# Patient Record
Sex: FEMALE | Race: WHITE
[De-identification: names, ages, dates, MRNs, and addresses within clinical notes are randomized per-mention and may not be internally consistent; named-entity substitution may affect disease eponyms.]

---

## 2019-01-02 ENCOUNTER — HOSPITAL ENCOUNTER (EMERGENCY)
Dept: HOSPITAL 97 - ER | Age: 34
Discharge: HOME | End: 2019-01-02
Payer: COMMERCIAL

## 2019-01-02 VITALS — TEMPERATURE: 97.4 F | SYSTOLIC BLOOD PRESSURE: 121 MMHG | DIASTOLIC BLOOD PRESSURE: 77 MMHG

## 2019-01-02 VITALS — OXYGEN SATURATION: 100 %

## 2019-01-02 DIAGNOSIS — F17.210: ICD-10-CM

## 2019-01-02 DIAGNOSIS — R10.10: Primary | ICD-10-CM

## 2019-01-02 DIAGNOSIS — R11.2: ICD-10-CM

## 2019-01-02 LAB
ALBUMIN SERPL BCP-MCNC: 4.2 G/DL (ref 3.4–5)
ALP SERPL-CCNC: 73 U/L (ref 45–117)
ALT SERPL W P-5'-P-CCNC: 26 U/L (ref 12–78)
AST SERPL W P-5'-P-CCNC: 16 U/L (ref 15–37)
BUN BLD-MCNC: 12 MG/DL (ref 7–18)
GLUCOSE SERPLBLD-MCNC: 128 MG/DL (ref 74–106)
HCT VFR BLD CALC: 45.6 % (ref 36–45)
LIPASE SERPL-CCNC: 103 U/L (ref 73–393)
LYMPHOCYTES # SPEC AUTO: 1.6 K/UL (ref 0.7–4.9)
PMV BLD: 8.6 FL (ref 7.6–11.3)
POTASSIUM SERPL-SCNC: 4.3 MMOL/L (ref 3.5–5.1)
RBC # BLD: 5.09 M/UL (ref 3.86–4.86)

## 2019-01-02 PROCEDURE — 83690 ASSAY OF LIPASE: CPT

## 2019-01-02 PROCEDURE — 85025 COMPLETE CBC W/AUTO DIFF WBC: CPT

## 2019-01-02 PROCEDURE — 80048 BASIC METABOLIC PNL TOTAL CA: CPT

## 2019-01-02 PROCEDURE — 99284 EMERGENCY DEPT VISIT MOD MDM: CPT

## 2019-01-02 PROCEDURE — 36415 COLL VENOUS BLD VENIPUNCTURE: CPT

## 2019-01-02 PROCEDURE — 80076 HEPATIC FUNCTION PANEL: CPT

## 2019-01-02 NOTE — EDPHYS
Physician Documentation                                                                           

 [facility]                                                                

Name: Radha Meza                                                                              

Age: 33 yrs                                                                                       

Sex: Female                                                                                       

: 1985                                                                                   

MRN: [id number]                                                                                   

Arrival Date: 2019                                                                          

Time: 06:12                                                                                       

Account#: [id number]                                                                            

Bed 7                                                                                             

Private MD:                                                                                       

ED Physician Rahul Ernandez                                                                             

HPI:                                                                                              

                                                                                             

07:41 This 33 yrs old  Female presents to ER via Ambulatory with complaints of       snw 

      Abdominal Pain, Vomiting.                                                                   

07:41 The patient presents with abdominal pain in the epigastric area. Onset: The             snw 

      symptoms/episode began/occurred suddenly, at 02:00. The symptoms do not radiate.            

      Associated signs and symptoms: Pertinent positives: nausea and vomiting. The symptoms       

      are described as burning, constant. Severity of pain: At its worst the pain was             

      moderate severe. The patient has not experienced similar symptoms in the past. The          

      patient has not recently seen a physician.                                                  

                                                                                                  

OB/GYN:                                                                                           

06:32 LMP 2018                                                                          ca1 

                                                                                                  

Historical:                                                                                       

- Allergies:                                                                                      

06:32 No Known Allergies;                                                                     ca1 

- Home Meds:                                                                                      

06:32 None [Active];                                                                          ca1 

- PMHx:                                                                                           

06:32 None;                                                                                   ca1 

- PSHx:                                                                                           

06:32 Tonsillectomy; Left Shoulder; Tubal ligation;                                           ca1 

                                                                                                  

- Immunization history:: Flu vaccine is up to date.                                               

- Social history:: Smoking status: Patient uses tobacco products, denies chronic                  

  smoking, but will smoke occasionally.                                                           

- Ebola Screening: : No symptoms or risks identified at this time.                                

                                                                                                  

                                                                                                  

ROS:                                                                                              

07:16 Constitutional: Negative for fever, chills, and weight loss, Eyes: Negative for injury, snw 

      pain, redness, and discharge, ENT: Negative for injury, pain, and discharge, Neck:          

      Negative for injury, pain, and swelling, Cardiovascular: Negative for chest pain,           

      palpitations, and edema, Respiratory: Negative for shortness of breath, cough,              

      wheezing, and pleuritic chest pain, Back: Negative for injury and pain, : Negative        

      for injury, bleeding, discharge, and swelling, MS/Extremity: Negative for injury and        

      deformity, Skin: Negative for injury, rash, and discoloration, Neuro: Negative for          

      headache, weakness, numbness, tingling, and seizure.                                        

07:16 Abdomen/GI: Positive for abdominal pain, nausea.                                            

                                                                                                  

Exam:                                                                                             

07:15 Constitutional:  This is a well developed, well nourished patient who is awake, alert,  snw 

      and in no acute distress. Head/Face:  Normocephalic, atraumatic. Eyes:  Pupils equal        

      round and reactive to light, extra-ocular motions intact.  Lids and lashes normal.          

      Conjunctiva and sclera are non-icteric and not injected.  Cornea within normal limits.      

      Periorbital areas with no swelling, redness, or edema. ENT:  Nares patent. No nasal         

      discharge, no septal abnormalities noted.  Tympanic membranes are normal and external       

      auditory canals are clear.  Oropharynx with no redness, swelling, or masses, exudates,      

      or evidence of obstruction, uvula midline.  Mucous membranes moist. Neck:  Trachea          

      midline, no thyromegaly or masses palpated, and no cervical lymphadenopathy.  Supple,       

      full range of motion without nuchal rigidity, or vertebral point tenderness.  No            

      Meningismus. Chest/axilla:  Normal chest wall appearance and motion.  Nontender with no     

      deformity.  No lesions are appreciated. Cardiovascular:  Regular rate and rhythm with a     

      normal S1 and S2.  No gallops, murmurs, or rubs.  Normal PMI, no JVD.  No pulse             

      deficits. Respiratory:  Lungs have equal breath sounds bilaterally, clear to                

      auscultation and percussion.  No rales, rhonchi or wheezes noted.  No increased work of     

      breathing, no retractions or nasal flaring. Back:  No spinal tenderness.  No                

      costovertebral tenderness.  Full range of motion. Skin:  Warm, dry with normal turgor.      

      Pale color with no rashes, no lesions, and no evidence of cellulitis. MS/ Extremity:        

      Pulses equal, no cyanosis.  Neurovascular intact.  Full, normal range of motion. Neuro:     

       Awake and alert, GCS 15, oriented to person, place, time, and situation.  Cranial          

      nerves II-XII grossly intact.  Motor strength 5/5 in all extremities.  Sensory grossly      

      intact.  Cerebellar exam normal.  Normal gait.                                              

07:15 Abdomen/GI: Inspection: abdomen appears normal, Bowel sounds: normal, Palpation:            

      moderate abdominal tenderness, in the epigastric area.                                      

                                                                                                  

Vital Signs:                                                                                      

06:32  / 86; Pulse 66; Resp 18; Temp 97.6; Pulse Ox 100% on R/A; Weight 79.38 kg;       ca1 

      Height 5 ft. 6 in. (167.64 cm); Pain 7/10;                                                  

07:20  / 88; Pulse 66; Resp 18; Pulse Ox 99% on R/A; Pain 3/10;                         ph  

08:36  / 64; Pulse 91; Resp 18; Pulse Ox 100% on R/A;                                   jb1 

09:08  / 77; Pulse 79; Resp 18; Temp 97.4; Pulse Ox 100% on R/A;                        ph  

06:32 Body Mass Index 28.25 (79.38 kg, 167.64 cm)                                             ca1 

                                                                                                  

MDM:                                                                                              

06:36 Patient medically screened.                                                             snw 

09:01 Data reviewed: vital signs, nurses notes. Data interpreted: Pulse oximetry: on room air snw 

      is 100 %. Interpretation: normal. Counseling: I had a detailed discussion with the          

      patient and/or guardian regarding: the historical points, exam findings, and any            

      diagnostic results supporting the discharge/admit diagnosis, lab results, the need for      

      outpatient follow up, to return to the emergency department if symptoms worsen or           

      persist or if there are any questions or concerns that arise at home. Special               

      discussion: Based on the patient's Hx, exam, and Dx evaluation, there is no indication      

      for emergent surgery or inpatient Tx. It is understood by the patient/guardian that if      

      the Sx's persist or worsen they need to return immediately for re-evaluation. Based on      

      the history and exam findings, there is no indication for further emergent testing or       

      inpatient evaluation. I discussed with the patient/guardian the need to see the primary     

      care provider for further evaluation of the symptoms.                                       

                                                                                                  

                                                                                             

06:32 Order name: Basic Metabolic Panel; Complete Time: 07:34                                 aa                                                                                             

06:32 Order name: CBC with Diff; Complete Time: 07:34                                         aa                                                                                             

06:32 Order name: Creatinine for Radiology; Complete Time: 07:34                              aa                                                                                             

06:32 Order name: Hepatic Function; Complete Time: 07:34                                      aa                                                                                             

06:32 Order name: Lipase; Complete Time: 07:34                                                aa                                                                                             

06:32 Order name: IV Saline Lock; Complete Time: 06:58                                        aa                                                                                             

06:32 Order name: Labs collected and sent; Complete Time: 06:59                               aa1 

                                                                                                  

Administered Medications:                                                                         

06:58 Drug: NS 0.9% 1000 ml Route: IV; Rate: 1 bolus; Site: left antecubital;                 ca1 

08:15 Follow up: Response: No adverse reaction; IV Status: Completed infusion                 ph  

07:00 Drug: Phenergan 6.25 mg Route: IVP; Site: left antecubital;                             ca1 

08:45 Follow up: Response: No adverse reaction; Nausea is decreased                           ph  

07:05 Drug: TORadol 30 mg Route: IVP; Site: left antecubital;                                 ca1 

07:45 Follow up: Response: No adverse reaction; Pain is decreased                             ph  

08:14 Drug: NS 0.9% 1000 ml Route: IV; Rate: 1 bolus; Site: right antecubital;                bb  

09:07 Follow up: Response: No adverse reaction; IV Status: Completed infusion                 ph  

09:00 Drug: ProTONIX 40 mg Route: IVP; Site: right antecubital;                               ph  

09:07 Follow up: Response: No adverse reaction                                                ph  

09:05 Drug: GI Cocktail without Donnatal - (Maalox Suspension 30 ml, Lidocaine Liquid 2 % 15  ph  

      ml) Route: PO;                                                                              

09:07 Follow up: Response: No adverse reaction                                                ph  

                                                                                                  

                                                                                                  

Disposition:                                                                                      

19 08:58 Discharged to Home. Impression: Upper abdominal pain, unspecified, Nausea and      

  vomiting.                                                                                       

- Condition is Stable.                                                                            

- Discharge Instructions: Abdominal Pain, Adult, Dehydration, Adult, Nausea and                   

  Vomiting, Adult, Rehydration, Adult.                                                            

- Prescriptions for Bentyl 20 mg Oral Tablet - take 1 tablet by ORAL route every 6                

  hours As needed; 20 tablet. Zofran 4 mg Oral Tablet - take 1 tablet by ORAL route               

  every 12 hours As needed; 20 tablet.                                                            

- Work release form, Family Work Release, Medication Reconciliation Form, Thank You               

  Letter, Antibiotic Education, Prescription Opioid Use form.                                     

- Follow up: Private Physician; When: 2 - 3 days; Reason: Recheck today's complaints,             

  Continuance of care, Re-evaluation by your physician. Follow up: Emergency                      

  Department; When: As needed; Reason: Worsening of condition.                                    

                                                                                                  

                                                                                                  

                                                                                                  

Signatures:                                                                                       

Dispatcher [employer]                           EDMS                                                 

Latonya Hinds RN                        RN   aa1                                                  

Kylee Galvez, MAYP-C                 FNP-Pily Romeo RN                     RN   Ita Marquez RN                      RN   ph                                                   

Felisha Jj RN                        RN   ca1                                                  

                                                                                                  

Corrections: (The following items were deleted from the chart)                                    

09:37 08:58 2019 08:58 Discharged to Home. Impression: Upper abdominal pain,            ph  

      unspecified; Nausea and vomiting. Condition is Stable. Forms are Medication                 

      Reconciliation Form, Thank You Letter, Antibiotic Education, Prescription Opioid Use.       

      Follow up: Private Physician; When: 2 - 3 days; Reason: Recheck today's complaints,         

      Continuance of care, Re-evaluation by your physician. Follow up: Emergency Department;      

      When: As needed; Reason: Worsening of condition. greg                                        

                                                                                                  

**************************************************************************************************

## 2019-01-02 NOTE — ER
Nurse's Notes                                                                                     

 [facility]                                                                

Name: Radha Meza                                                                              

Age: 33 yrs                                                                                       

Sex: Female                                                                                       

: 1985                                                                                   

MRN: [id number]                                                                                   

Arrival Date: 2019                                                                          

Time: 06:12                                                                                       

Account#: [id number]                                                                            

Bed 7                                                                                             

Private MD:                                                                                       

Diagnosis: Upper abdominal pain, unspecified;Nausea and vomiting                                  

                                                                                                  

Presentation:                                                                                     

                                                                                             

06:30 Presenting complaint: Patient states: she has abdominal pain, N\T\V since 0200 today.     ca1

      Transition of care: patient was not received from another setting of care. Onset of         

      symptoms was 2019. Risk Assessment: Do you want to hurt yourself or someone     

      else? Patient reports no desire to harm self or others. Initial Sepsis Screen: Does the     

      patient meet any 2 criteria? No. Patient's initial sepsis screen is negative. Does the      

      patient have a suspected source of infection? Yes: Acute abdominal pain. Care prior to      

      arrival: None.                                                                              

06:30 Method Of Arrival: Ambulatory                                                           ca1 

06:30 Acuity: JOHN 3                                                                           ca1 

                                                                                                  

OB/GYN:                                                                                           

06:32 LMP 2018                                                                          ca1 

                                                                                                  

Historical:                                                                                       

- Allergies:                                                                                      

06:32 No Known Allergies;                                                                     ca1 

- Home Meds:                                                                                      

06:32 None [Active];                                                                          ca1 

- PMHx:                                                                                           

06:32 None;                                                                                   ca1 

- PSHx:                                                                                           

06:32 Tonsillectomy; Left Shoulder; Tubal ligation;                                           ca1 

                                                                                                  

- Immunization history:: Flu vaccine is up to date.                                               

- Social history:: Smoking status: Patient uses tobacco products, denies chronic                  

  smoking, but will smoke occasionally.                                                           

- Ebola Screening: : No symptoms or risks identified at this time.                                

                                                                                                  

                                                                                                  

Screenin:35 Abuse screen: Denies threats or abuse. Denies injuries from another. Nutritional        ca1 

      screening: No deficits noted. Tuberculosis screening: No symptoms or risk factors           

      identified. Fall Risk None identified.                                                      

                                                                                                  

Assessment:                                                                                       

06:35 General: Appears in no apparent distress. uncomfortable, Behavior is calm, cooperative, ca1 

      appropriate for age. General: Behavior is. Pain: Complains of pain in abdomen Pain          

      currently is 7 out of 10 on a pain scale. Pain began 4 hours ago. Neuro: Level of           

      Consciousness is awake, alert, obeys commands, Oriented to person, place, time,             

      situation. Cardiovascular: Heart tones S1 S2 present Capillary refill < 3 seconds           

      Patient's skin is warm and dry. Respiratory: Airway is patent Trachea midline               

      Respiratory effort is even, unlabored, Respiratory pattern is regular, symmetrical,         

      Breath sounds are clear bilaterally. GI: Abdomen is round non-distended, Bowel sounds       

      present X 4 quads. Abd is soft Abdomen is tender to palpation. : No signs and/or          

      symptoms were reported regarding the genitourinary system. EENT: No signs and/or            

      symptoms were reported regarding the EENT system. Derm: Skin is intact, is healthy with     

      good turgor, Skin is pink, warm \T\ dry. Musculoskeletal: Circulation, motion, and          

      sensation intact. Capillary refill < 3 seconds, Range of motion: intact in all              

      extremities.                                                                                

06:35 GI: Reports nausea, vomiting, Patient currently denies.                                 ca1 

07:19 Reassessment: Patient appears in no apparent distress at this time. Patient and/or      ph  

      family updated on plan of care and expected duration. Pain level reassessed. Patient is     

      alert, oriented x 3, equal unlabored respirations, skin warm/dry/pink. Pt resting           

      comfortably, reports that pain and nausea has improved, awaiting lab results, SO at         

      bedside.                                                                                    

08:00 Reassessment: Patient appears in no apparent distress at this time. No changes from     ph  

      previously documented assessment. Patient and/or family updated on plan of care and         

      expected duration. Pain level reassessed. Patient is alert, oriented x 3, equal             

      unlabored respirations, skin warm/dry/pink.                                                 

09:00 Reassessment: Patient appears in no apparent distress at this time. Patient and/or      ph  

      family updated on plan of care and expected duration. Pain level reassessed. Patient is     

      alert, oriented x 3, equal unlabored respirations, skin warm/dry/pink. Pt c/o pain in       

      abdomen and requesting more IV pain mediation, ERP notified, see MAR.                       

09:34 Reassessment: Patient appears in no apparent distress at this time. Patient and/or      ph  

      family updated on plan of care and expected duration. Pain level reassessed. Patient is     

      alert, oriented x 3, equal unlabored respirations, skin warm/dry/pink. Pt reports that      

      pain has improved after GI cocktail, d/c home w/ SO.                                        

                                                                                                  

Vital Signs:                                                                                      

06:32  / 86; Pulse 66; Resp 18; Temp 97.6; Pulse Ox 100% on R/A; Weight 79.38 kg;       ca1 

      Height 5 ft. 6 in. (167.64 cm); Pain 7/10;                                                  

07:20  / 88; Pulse 66; Resp 18; Pulse Ox 99% on R/A; Pain 3/10;                         ph  

08:36  / 64; Pulse 91; Resp 18; Pulse Ox 100% on R/A;                                   jb1 

09:08  / 77; Pulse 79; Resp 18; Temp 97.4; Pulse Ox 100% on R/A;                        ph  

06:32 Body Mass Index 28.25 (79.38 kg, 167.64 cm)                                             ca1 

                                                                                                  

ED Course:                                                                                        

06:12 Patient arrived in ED.                                                                  es  

06:30 Felisha Jj, RN is Primary Nurse.                                                      ca1 

06:31 Triage completed.                                                                       ca1 

06:32 Arm band placed on right wrist.                                                         ca1 

06:35 Patient has correct armband on for positive identification. Placed in gown. Bed in low  ca1 

      position. Call light in reach. Side rails up X 1.                                           

06:35 Pulse ox on. NIBP on. Warm blanket given.                                               ca1 

06:36 [facility]                                                        snw 

06:36 Rahul Ernandez MD is Attending Physician.                                                    snw 

06:55 Initial lab(s) drawn, by ED staff, sent to lab. Inserted saline lock: 20 gauge in left  ca1 

      antecubital area, using aseptic technique. Blood collected.                                 

09:36 No provider procedures requiring assistance completed. IV discontinued, intact,         ph  

      bleeding controlled, No redness/swelling at site. Pressure dressing applied.                

                                                                                                  

Administered Medications:                                                                         

06:58 Drug: NS 0.9% 1000 ml Route: IV; Rate: 1 bolus; Site: left antecubital;                 ca1 

08:15 Follow up: Response: No adverse reaction; IV Status: Completed infusion                 ph  

07:00 Drug: Phenergan 6.25 mg Route: IVP; Site: left antecubital;                             ca1 

08:45 Follow up: Response: No adverse reaction; Nausea is decreased                           ph  

07:05 Drug: TORadol 30 mg Route: IVP; Site: left antecubital;                                 ca1 

07:45 Follow up: Response: No adverse reaction; Pain is decreased                             ph  

08:14 Drug: NS 0.9% 1000 ml Route: IV; Rate: 1 bolus; Site: right antecubital;                bb  

09:07 Follow up: Response: No adverse reaction; IV Status: Completed infusion                 ph  

09:00 Drug: ProTONIX 40 mg Route: IVP; Site: right antecubital;                               ph  

09:07 Follow up: Response: No adverse reaction                                                ph  

09:05 Drug: GI Cocktail without Donnatal - (Maalox Suspension 30 ml, Lidocaine Liquid 2 % 15  ph  

      ml) Route: PO;                                                                              

09:07 Follow up: Response: No adverse reaction                                                ph  

                                                                                                  

                                                                                                  

Outcome:                                                                                          

08:58 Discharge ordered by MD. garza 

09:36 Discharged to home ambulatory, with significant other.                                  ph  

09:36 Condition: improved                                                                         

09:36 Discharge instructions given to patient, Instructed on discharge instructions, follow       

      up and referral plans. medication usage, Demonstrated understanding of instructions,        

      follow-up care, medications, Prescriptions given X 2.                                       

09:37 Patient left the ED.                                                                    ph  

                                                                                                  

Signatures:                                                                                       

Ramiro Rey                                 jb1                                                  

Kylee Galvez, FNP-C                 FNP-Csnw                                                  

Merary Rod Brenda, RN                     RN   bb                                                   

Ita Flowers RN                      RN   ph                                                   

Acob, Felisha RN                        RN   ca1                                                  

                                                                                                  

Corrections: (The following items were deleted from the chart)                                    

09:07 09:07 Response: No adverse reaction; Medication administered at discharge. ph           ph  

                                                                                                  

**************************************************************************************************

## 2019-01-04 ENCOUNTER — HOSPITAL ENCOUNTER (INPATIENT)
Dept: HOSPITAL 97 - ER | Age: 34
LOS: 1 days | Discharge: HOME | DRG: 392 | End: 2019-01-05
Attending: FAMILY MEDICINE | Admitting: HOSPITALIST
Payer: COMMERCIAL

## 2019-01-04 VITALS — BODY MASS INDEX: 30 KG/M2

## 2019-01-04 DIAGNOSIS — Q43.3: ICD-10-CM

## 2019-01-04 DIAGNOSIS — F41.8: ICD-10-CM

## 2019-01-04 DIAGNOSIS — R10.11: Primary | ICD-10-CM

## 2019-01-04 LAB
ALBUMIN SERPL BCP-MCNC: 3.8 G/DL (ref 3.4–5)
ALP SERPL-CCNC: 71 U/L (ref 45–117)
ALT SERPL W P-5'-P-CCNC: 25 U/L (ref 12–78)
AST SERPL W P-5'-P-CCNC: 14 U/L (ref 15–37)
BUN BLD-MCNC: 15 MG/DL (ref 7–18)
GLUCOSE SERPLBLD-MCNC: 96 MG/DL (ref 74–106)
HCT VFR BLD CALC: 43.4 % (ref 36–45)
LIPASE SERPL-CCNC: 88 U/L (ref 73–393)
LYMPHOCYTES # SPEC AUTO: 3.3 K/UL (ref 0.7–4.9)
PMV BLD: 8.5 FL (ref 7.6–11.3)
POTASSIUM SERPL-SCNC: 3.8 MMOL/L (ref 3.5–5.1)
RBC # BLD: 4.77 M/UL (ref 3.86–4.86)
UA COMPLETE W REFLEX CULTURE PNL UR: (no result)

## 2019-01-04 PROCEDURE — 36415 COLL VENOUS BLD VENIPUNCTURE: CPT

## 2019-01-04 PROCEDURE — 80048 BASIC METABOLIC PNL TOTAL CA: CPT

## 2019-01-04 PROCEDURE — 76705 ECHO EXAM OF ABDOMEN: CPT

## 2019-01-04 PROCEDURE — 74177 CT ABD & PELVIS W/CONTRAST: CPT

## 2019-01-04 PROCEDURE — 83735 ASSAY OF MAGNESIUM: CPT

## 2019-01-04 PROCEDURE — 85025 COMPLETE CBC W/AUTO DIFF WBC: CPT

## 2019-01-04 PROCEDURE — 80076 HEPATIC FUNCTION PANEL: CPT

## 2019-01-04 PROCEDURE — 81015 MICROSCOPIC EXAM OF URINE: CPT

## 2019-01-04 PROCEDURE — 81025 URINE PREGNANCY TEST: CPT

## 2019-01-04 PROCEDURE — 96374 THER/PROPH/DIAG INJ IV PUSH: CPT

## 2019-01-04 PROCEDURE — 83690 ASSAY OF LIPASE: CPT

## 2019-01-04 PROCEDURE — 81003 URINALYSIS AUTO W/O SCOPE: CPT

## 2019-01-04 PROCEDURE — 87088 URINE BACTERIA CULTURE: CPT

## 2019-01-04 PROCEDURE — 87086 URINE CULTURE/COLONY COUNT: CPT

## 2019-01-04 PROCEDURE — 96361 HYDRATE IV INFUSION ADD-ON: CPT

## 2019-01-04 PROCEDURE — 99285 EMERGENCY DEPT VISIT HI MDM: CPT

## 2019-01-04 PROCEDURE — 84100 ASSAY OF PHOSPHORUS: CPT

## 2019-01-04 PROCEDURE — 96375 TX/PRO/DX INJ NEW DRUG ADDON: CPT

## 2019-01-04 NOTE — RAD REPORT
EXAM DESCRIPTION:  US - Abdomen Exam Limited - 1/4/2019 9:56 pm

 

CLINICAL HISTORY:  ABD PAIN

 

COMPARISON:  No comparisons

 

FINDINGS:  The gallbladder demonstrates no gallstones. No pericholecystic fluid or gallbladder wall t
hickening. The common bile duct is normal measuring 3 mm.

 

The liver demonstrates no findings of intrahepatic biliary dilatation.

 

IMPRESSION:  Unremarkable examination.

## 2019-01-05 VITALS — DIASTOLIC BLOOD PRESSURE: 73 MMHG | TEMPERATURE: 97.3 F | SYSTOLIC BLOOD PRESSURE: 111 MMHG

## 2019-01-05 VITALS — OXYGEN SATURATION: 100 %

## 2019-01-05 LAB
MAGNESIUM SERPL-MCNC: 2.1 MG/DL (ref 1.8–2.4)
UA DIPSTICK W REFLEX MICRO PNL UR: (no result)

## 2019-01-05 NOTE — RAD REPORT
EXAM DESCRIPTION:  CT - Abdomen   Pelvis W Contrast - 1/5/2019 6:21 am

 

CLINICAL HISTORY:   Abdominal pain. Epigastric pain

 

COMPARISON:  None.

 

TECHNIQUE:  Computed axial tomography of the abdomen and pelvis was obtained. 100 cc Isovue-300 is ad
ministered intravenously. Oral contrast was given. Preliminary report generated by virtual radiologic
 and review prior to dictation

 

All CT scans are performed using dose optimization technique as appropriate and may include automated
 exposure control or mA/KV adjustment according to patient size.

 

FINDINGS:

The liver, spleen, pancreas, adrenals and kidneys appear unremarkable.

 

The appendix is normal caliber. There is no evidence of diverticulitis

 

Intestinal malrotation suspected as most of the jejunum is present within the right abdomen. However,
 the ascending colon and cecum is present on the right

 

Moderate amount of stool is present throughout the colon

 

IMPRESSION:  Moderate amount of stool present throughout the colon

 

Intestinal malrotation suspected

## 2019-01-05 NOTE — CON
Date of Consultation:  01/05/2019



Brief History Of Present Illness:  The patient is a 33-year-old female who presents to the hospital w
ith abdominal pain beginning several days ago.  She states that the pain has been getting significant
ly worse over the course of several days.  She has come to the emergency room several times in the pa
st few days with complaints of abdominal pain and the pain was predominantly located in the right upp
er quadrant.  She has not had similar episodes before in the past and the pain was sharp and crampy t
ype in that region.  It was associated with nausea and vomiting, which has improved some.  She had 1 
episode of nausea, vomiting yesterday, but multiple episodes the day prior.  She continues to have luna
wel function and had a bowel movement this morning.  Her symptoms have improved significantly over th
e course of the day.  There were no particular food associations.  However, ingestion of any material
 did cause worsening of the symptoms initially.  There was no particular greasy food association or o
ther food related symptoms.  She has had no sick contacts.  No recent travel.



Past Medical History:  Negative.



Past Surgical History:  She has had a tonsillectomy, left shoulder rotator cuff repair, and a tubal l
igation.



Allergies:  NO KNOWN DRUG ALLERGIES.



Home Medications:  Include only multivitamins.



Social History:  She denies smoking, alcohol, or recreational drug use.  She works as a nurse at [employer].



Review of Systems:

A 10-point review of systems other than HPI, denies.



Physical Examination:

Vital Signs:  At the time examination, her vital signs are a BMI of 30.1.  She is 5 feet 6 inches, 18
6 pounds.  Her blood pressure was 113/67, pulse 74, respiratory rate 18, temperature 97.9. 

General:  She is awake, alert, and oriented. 

Psychiatric:  She is appropriate and conversive. 

HEENT:  She is normocephalic.  Sclerae anicteric.  Mucous membranes are moist.  Oropharynx clear. 

Neck:  Supple.  No JVD. Chest:  Normal expansion and excursion. 

Cardiovascular:  Regular rate and rhythm. 

Pulmonary:  Clear to auscultation bilaterally. 

Abdomen:  Soft, nontender, nondistended.  No rebound or guarding.  No focal peritonitis.  Negative Mu
rphy sign.  Negative psoas sign.  No hernias appreciated. 

Extremities:  No clubbing, cyanosis, or edema. 

Skin:  Warm and dry.



Laboratory Data:  Reveals a white blood count 11.1, hemoglobin is 14.4, hematocrit of 43.4, platelet 
count is 330, neutrophils are 59.5% and are normal.  Sodium 139, potassium 3.8, chloride 104, carbon 
dioxide 29, BUN 15, creatinine 0.7, glucose is 96, calcium 9.0, phosphorus 3.2, magnesium 2.1, total 
bilirubin 0.3, AST 14, ALT 25, alkaline phosphatase 71, lipase is 88.  She had a UA, which showed tra
ce blood and 5-10 squamous epithelial cells, 20 to 50 bacteria.  Urine pregnancy test was negative.  
She had a CT scan performed and pelvis as well as an abdominal ultrasound.  Ultrasound showed officia
lly read as unremarkable examination.  Gallbladder demonstrates no gallstones, no pericholecystic flu
id or gallbladder wall thickening.  Common bile duct is normal measuring 3 mm.  Liver demonstrates no
 findings of intrahepatic biliary ductal dilatation.  Abdomen and pelvis CT was officially read as mo
derate amount of stool present throughout the colon.  Intestinal malrotation may be suspected.  Intes
tinal malrotation is suspect as most of the jejunum was present within the right abdomen.  However, t
he ascending colon and cecum are present on the right.  I have personally reviewed this CT findings, 
and I am unsure if the patient has malrotation based on the imaging alone, and she does not have clas
sic symptoms of malrotation leading up to this particular episode.



Assessment And Plan:  This is a 33-year-old female who comes in with abdominal pain of the right uppe
r quadrant.

1.IV fluid hydration.

2.N.p.o. status due to be advanced to clear liquid diet at this point.

3.Serial abdominal exams.

4.I have explained the risks, benefits, and alternatives of above stated plan.  The patient agrees t
o proceed as indicated.  We will plan for nonoperative management at this time, however, should her s
ymptoms worsen and she becomes obstructed or show ischemic changes, we will likely proceed with surge
ry to correct the malrotation, if indeed this is highly suspected. 

Thank you for this interesting consult.





LENA/PRESLEY

DD:  01/05/2019 12:41:49Voice ID:  [id number]

DT:  01/05/2019 14:12:00Report ID:  [id number]

## 2019-01-05 NOTE — ER
Nurse's Notes                                                                                     

 [facility]                                                                

Name: Radha Meza                                                                              

Age: 33 yrs                                                                                       

Sex: Female                                                                                       

: 1985                                                                                   

MRN: [id number]                                                                                   

Arrival Date: 2019                                                                          

Time: 19:01                                                                                       

Account#: [id number]                                                                            

Bed 13                                                                                            

Private MD:                                                                                       

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                                

                                                                                                  

Presentation:                                                                                     

                                                                                             

19:06 Presenting complaint: Patient states: upper abd pain radiating to the R; was here 2     hj  

      days ago, was Rx, Bentyl and Zofran, pain is getting worse, reports nausea, denies          

      diarrhea, constipation;. Transition of care: patient was not received from another          

      setting of care. Onset of symptoms was 2019. Risk Assessment: Do you want       

      to hurt yourself or someone else? Patient reports no desire to harm self or others.         

      Initial Sepsis Screen: Does the patient meet any 2 criteria? No. Patient's initial          

      sepsis screen is negative. Does the patient have a suspected source of infection? No.       

      Patient's initial sepsis screen is negative. Care prior to arrival: None.                   

19:06 Method Of Arrival: Ambulatory                                                             

19:06 Acuity: JOHN 3                                                                             

                                                                                                  

Triage Assessment:                                                                                

19:09 General: Appears in no apparent distress. uncomfortable, Behavior is calm, cooperative, hj  

      appropriate for age. Pain: Complains of pain in abdomen. GI: Reports upper abdominal        

      pain, nausea.                                                                               

                                                                                                  

OB/GYN:                                                                                           

19:09 LMP 2018                                                                            

                                                                                                  

Historical:                                                                                       

- Allergies:                                                                                      

19:08 No Known Allergies;                                                                     hj  

- Home Meds:                                                                                      

19:08 vitamins [Active];                                                                      hj  

- PMHx:                                                                                           

19:08 None;                                                                                     

- PSHx:                                                                                           

19:08 Tonsillectomy; Left Shoulder; Tubal ligation;                                           hj  

                                                                                                  

- Immunization history:: Adult Immunizations up to date.                                          

- Social history:: Smoking status: Patient/guardian denies using tobacco,                         

  Patient/guardian denies using alcohol.                                                          

- Ebola Screening: : Patient negative for fever greater than or equal to 101.5 degrees            

  Fahrenheit, and additional compatible Ebola Virus Disease symptoms Patient denies               

  exposure to infectious person Patient denies travel to an Ebola-affected area in the            

  21 days before illness onset.                                                                   

                                                                                                  

                                                                                                  

Screenin:11 Abuse screen: Denies threats or abuse. Denies injuries from another. Nutritional        hj  

      screening: No deficits noted. Tuberculosis screening: No symptoms or risk factors           

      identified. Fall Risk None identified.                                                      

                                                                                                  

Assessment:                                                                                       

19:11 GI: Bowel sounds present X 4 quads. Abd is soft Abdomen is tender to palpation.         hj  

20:54 Reassessment: Patient appears in no apparent distress at this time. Patient and/or      cc3 

      family updated on plan of care and expected duration. Pain level reassessed. Patient is     

      alert, oriented x 3, equal unlabored respirations, skin warm/dry/pink.                      

21:25 Reassessment: Patient appears in no apparent distress at this time. Patient and/or      cc3 

      family updated on plan of care and expected duration. Pain level reassessed. Patient is     

      alert, oriented x 3, equal unlabored respirations, skin warm/dry/pink.                      

22:45 Reassessment: Patient appears in no apparent distress at this time. Patient and/or      cc3 

      family updated on plan of care and expected duration. Pain level reassessed. Patient is     

      alert, oriented x 3, equal unlabored respirations, skin warm/dry/pink.                      

23:18 Reassessment: Patient appears in no apparent distress at this time. Patient and/or      cc3 

      family updated on plan of care and expected duration. Pain level reassessed. Patient is     

      alert, oriented x 3, equal unlabored respirations, skin warm/dry/pink.                      

                                                                                             

00:27 Reassessment: Patient appears in no apparent distress at this time. Patient and/or      cc3 

      family updated on plan of care and expected duration. Pain level reassessed. Patient is     

      alert, oriented x 3, equal unlabored respirations, skin warm/dry/pink.                      

01:00 Reassessment: Patient appears in no apparent distress at this time. Patient and/or      cc3 

      family updated on plan of care and expected duration. Pain level reassessed. Patient is     

      alert, oriented x 3, equal unlabored respirations, skin warm/dry/pink. Patient came         

      back from CT scan department, awaiting result.                                              

02:25 Reassessment: Patient appears in no apparent distress at this time. Patient and/or      cc3 

      family updated on plan of care and expected duration. Pain level reassessed. Patient is     

      alert, oriented x 3, equal unlabored respirations, skin warm/dry/pink.                      

03:15 Reassessment: Patient appears in no apparent distress at this time. Patient and/or      cc3 

      family updated on plan of care and expected duration. Pain level reassessed. Patient is     

      alert, oriented x 3, equal unlabored respirations, skin warm/dry/pink. Patient for          

      admission, room available in 426, called for report but Dr. Monteiro said the nurse who         

      will receive is still busy, to wait for her return call; charge nurse Shira informed.     

03:50 Reassessment: Patient appears in no apparent distress at this time. Patient and/or      cc3 

      family updated on plan of care and expected duration. Pain level reassessed. Patient is     

      alert, oriented x 3, equal unlabored respirations, skin warm/dry/pink. Report handed        

      over to LAUREN Paris for continuity of care.                                             

04:00 Reassessment: Patient appears in no apparent distress at this time. Patient and/or      cc3 

      family updated on plan of care and expected duration. Pain level reassessed. Patient is     

      alert, oriented x 3, equal unlabored respirations, skin warm/dry/pink. Patient left ER      

      for admission vitally stable by wheelchair escorted by CHERYL Núñez.                       

                                                                                                  

Vital Signs:                                                                                      

                                                                                             

19:09  / 83; Pulse 80; Resp 18; Temp 97.5(O); Pulse Ox 98% on R/A; Weight 79.38 kg;     hj  

      Height 5 ft. 6 in. (167.64 cm); Pain 5/10;                                                  

20:54  / 82; Pulse 67; Resp 19 S; Pulse Ox 97% on R/A;                                  cc3 

21:33  / 90; Pulse 65; Resp 18 S; Pulse Ox 97% on R/A;                                  cc3 

22:45  / 89; Pulse 61; Resp 17 S; Pulse Ox 97% on R/A;                                  cc3 

23:30  / 75; Pulse 63; Resp 17 S; Pulse Ox 96% on R/A;                                  cc3 

                                                                                             

00:15  / 81; Pulse 64; Resp 18 S; Pulse Ox 98% on R/A;                                  cc3 

01:37  / 80; Pulse 65; Resp 18 S; Pulse Ox 95% on R/A;                                  cc3 

02:15  / 69; Pulse 64; Resp 19 S; Pulse Ox 96% on R/A;                                  cc3 

03:52  / 73; Pulse 66; Resp 18 S; Pulse Ox 96% on R/A;                                  cc3 

                                                                                             

19:09 Body Mass Index 28.25 (79.38 kg, 167.64 cm)                                               

                                                                                                  

ED Course:                                                                                        

                                                                                             

19:01 Patient arrived in ED.                                                                  mr  

19:07 Triage completed.                                                                       hj  

19:11 Arm band placed on left wrist.                                                          hj  

19:11 Patient has correct armband on for positive identification. Bed in low position. Call     

      light in reach. Side rails up X 1. Adult w/ patient.                                        

20:50 Jose Rodgers PA is PHCP.                                                                cp  

20:50 Rahul Ernandez MD is Attending Physician.                                                    cp  

20:54 Maris Dang is Primary Nurse.                                                      cc3 

21:15 Inserted saline lock: 20 gauge in right antecubital area, using aseptic technique.      cc3 

      Blood collected.                                                                            

21:45 Ultrasound completed. Patient tolerated well.                                           aa4 

21:56 US Abdomen Limited: RUQ/epigastric pain In Process Unspecified.                         EDMS

                                                                                             

00:25 Patient moved to CT via wheelchair.                                                     kw1 

00:35 CT Abd/Pelvis - W/Contrast: give oral contrast In Process Unspecified.                  EDMS

00:36 CT completed. Patient tolerated procedure well. Patient moved back from CT.             kw1 

02:22 Lakshmi Monteiro MD is Hospitalizing Provider.                                           cp  

03:50 No provider procedures requiring assistance completed. Patient admitted, IV remains in  cc3 

      place.                                                                                      

                                                                                                  

Administered Medications:                                                                         

                                                                                             

21:15 Drug: Zofran 4 mg Route: IVP; Site: right antecubital;                                  cc3 

22:00 Follow up: Response: No adverse reaction                                                cc3 

21:19 Drug: morphine 2 mg Route: IVP; Site: right antecubital;                                cc3 

22:00 Follow up: Response: No adverse reaction                                                cc3 

21:22 Drug: Pepcid 20 mg Route: IVP; Site: right antecubital;                                 cc3 

22:00 Follow up: Response: No adverse reaction                                                cc3 

22:30 Drug: GI Cocktail without Donnatal - (Maalox Suspension 30 ml, Lidocaine Liquid 2 % 15  cc3 

      ml) Route: PO;                                                                              

23:00 Follow up: Response: No adverse reaction                                                cc3 

23:10 Drug: Bentyl 20 mg Route: PO;                                                           cc3 

                                                                                             

00:00 Follow up: Response: No adverse reaction                                                cc3 

                                                                                             

23:11 Drug: Zofran 4 mg Route: IVP; Site: right antecubital;                                  cc3 

23:30 Follow up: Response: No adverse reaction; Nausea is decreased                           cc3 

23:15 Drug: TORadol 30 mg Route: IVP; Site: right antecubital;                                cc3 

23:40 Follow up: Response: No adverse reaction; Pain is decreased                             cc3 

                                                                                             

02:40 Drug: NS 0.9% 1000 ml Route: IV; Rate: 1 bolus; Site: right antecubital;                rr5 

03:40 Follow up: Response: No adverse reaction; IV Status: Completed infusion; IV Intake:     cc3 

      1000ml                                                                                      

02:45 Drug: fentaNYL (PF) 25 mcg Route: IVP; Site: right antecubital;                         rr5 

03:00 Follow up: Response: No adverse reaction; Pain is decreased                             cc3 

03:50 Drug: NS 0.9% 1000 ml Route: IV; Rate: 125 ml/hr; Site: right antecubital;              cc3 

04:00 Follow up: Response: No adverse reaction; IV Status: Infusion continued upon admission  cc3 

                                                                                                  

                                                                                                  

Intake:                                                                                           

03:40 IV: 1000ml; Total: 1000ml.                                                              cc3 

                                                                                                  

Outcome:                                                                                          

02:23 Decision to Hospitalize by Provider.                                                    cp  

03:50 Admitted to Tele accompanied by tech, family with patient, via wheelchair, room 426,    cc3 

      with chart, Report called to  LAUREN Paris                                               

03:50 Condition: stable                                                                           

03:50 Instructed on the need for admit, Demonstrated understanding of instructions.               

04:00 Patient left the ED.                                                                    cc3 

                                                                                                  

Signatures:                                                                                       

Dispatcher [employer]                           CHERYLStefany RoyalSo                              aa4                                                  

Odell Borja, RN                      Jose Burnett PA PA                                                      

Elizabeth Bustillos                            1                                                  

Maris Dang                             cc3                                                  

Dale Martins RN                      RN   rr5                                                  

                                                                                                  

**************************************************************************************************

## 2019-01-05 NOTE — EDPHYS
Physician Documentation                                                                           

 [facility]                                                                

Name: Radha Meza                                                                              

Age: 33 yrs                                                                                       

Sex: Female                                                                                       

: 1985                                                                                   

MRN: [id number]                                                                                   

Arrival Date: 2019                                                                          

Time: 19:01                                                                                       

Account#: [id number]                                                                            

Bed 13                                                                                            

Private MD:                                                                                       

ED Physician Rahul Ernandez                                                                             

HPI:                                                                                              

                                                                                             

21:00 This 33 yrs old  Female presents to ER via Ambulatory with complaints of       cp  

      Abdominal Pain, Nausea/Vomiting.                                                            

21:00 The patient presents with abdominal pain in the upper abdomen. Onset: The               cp  

      symptoms/episode began/occurred suddenly, 3 day(s) ago. The symptoms radiate to back.       

      Associated signs and symptoms: Pertinent positives: nausea and vomiting, anorexia,          

      Pertinent negatives: blood in stools, constipation, diarrhea, fever, vomiting blood.        

      The symptoms are described as constant. Severity of pain: in the emergency department       

      the pain is unchanged despite home interventions. The patient has been recently seen at     

      the [facility] Emergency Department, this week, for similar          

      complaints labs were performed.                                                             

                                                                                                  

OB/GYN:                                                                                           

19:09 LMP 2018                                                                          hj  

                                                                                                  

Historical:                                                                                       

- Allergies:                                                                                      

19:08 No Known Allergies;                                                                     hj  

- Home Meds:                                                                                      

19:08 vitamins [Active];                                                                      hj  

- PMHx:                                                                                           

19:08 None;                                                                                   hj  

- PSHx:                                                                                           

19:08 Tonsillectomy; Left Shoulder; Tubal ligation;                                           hj  

                                                                                                  

- Immunization history:: Adult Immunizations up to date.                                          

- Social history:: Smoking status: Patient/guardian denies using tobacco,                         

  Patient/guardian denies using alcohol.                                                          

- Ebola Screening: : Patient negative for fever greater than or equal to 101.5 degrees            

  Fahrenheit, and additional compatible Ebola Virus Disease symptoms Patient denies               

  exposure to infectious person Patient denies travel to an Ebola-affected area in the            

  21 days before illness onset.                                                                   

                                                                                                  

                                                                                                  

ROS:                                                                                              

21:05 Constitutional: Negative for body aches, chills, fever, poor PO intake.                 cp  

21:05 Eyes: Negative for injury, pain, redness, and discharge.                                cp  

21:05 ENT: Negative for drainage from ear(s), ear pain, sore throat, difficulty swallowing,       

      difficulty handling secretions.                                                             

21:05 Cardiovascular: Negative for chest pain, edema, palpitations.                               

21:05 Respiratory: Negative for cough, shortness of breath, wheezing.                             

21:05 Abdomen/GI: Positive for abdominal pain, nausea and vomiting, anorexia, Negative for        

      diarrhea, constipation, dysphagia, black/tarry stool, rectal bleeding.                      

21:05 Back: Positive for radiated pain, Negative for injury or acute deformity, decreased         

      range of motion.                                                                            

21:05 : Negative for urinary symptoms.                                                          

21:05 Skin: Negative for cellulitis, rash.                                                        

21:05 Neuro: Negative for altered mental status, headache, weakness.                              

21:05 All other systems are negative.                                                             

                                                                                                  

Exam:                                                                                             

21:10 Constitutional: The patient appears in no acute distress, alert, awake,                 cp  

      non-diaphoretic, non-toxic, well developed, well nourished, uncomfortable.                  

21:10 Head/Face:  Normocephalic, atraumatic.                                                  cp  

21:10 Eyes: Periorbital structures: appear normal, Conjunctiva: normal, no exudate, no            

      injection, Sclera: no appreciated abnormality, Lids and lashes: appear normal,              

      bilaterally.                                                                                

21:10 ENT: External ear(s): are unremarkable, Nose: is normal, Mouth: Lips: moist, Oral           

      mucosa: pink and intact, moist, Posterior pharynx: is normal, airway is patent, no          

      erythema, no exudate.                                                                       

21:10 Neck: ROM/movement: is normal, is supple, without pain, no range of motions                 

      limitations, no meningismus, no nuchal rigidity.                                            

21:10 Chest/axilla: Inspection: normal, Palpation: is normal, no crepitus, no tenderness.         

21:10 Cardiovascular: Rate: normal, Rhythm: regular, Edema: is not appreciated, JVD: is not       

      appreciated.                                                                                

21:10 Respiratory: the patient does not display signs of respiratory distress,  Respirations:     

      normal, no use of accessory muscles, no retractions, no splinting, no tachypnea,            

      labored breathing, is not present, Breath sounds: are clear throughout, no decreased        

      breath sounds, no stridor, no wheezing.                                                     

21:10 Abdomen/GI: Inspection: abdomen appears normal, Bowel sounds: active, all quadrants,        

      Palpation: soft, in all quadrants, severe abdominal tenderness, in the right upper          

      quadrant and left upper quadrant, rebound tenderness, is not appreciated, voluntary         

      guarding, is elicited in the right upper quadrant and left upper quadrant.                  

21:10 Back: CVA tenderness, is absent.                                                            

21:10 Skin: cellulitis, is not appreciated, no rash present.                                      

                                                                                                  

Vital Signs:                                                                                      

19:09  / 83; Pulse 80; Resp 18; Temp 97.5(O); Pulse Ox 98% on R/A; Weight 79.38 kg;     hj  

      Height 5 ft. 6 in. (167.64 cm); Pain 5/10;                                                  

20:54  / 82; Pulse 67; Resp 19 S; Pulse Ox 97% on R/A;                                  cc3 

21:33  / 90; Pulse 65; Resp 18 S; Pulse Ox 97% on R/A;                                  cc3 

22:45  / 89; Pulse 61; Resp 17 S; Pulse Ox 97% on R/A;                                  cc3 

23:30  / 75; Pulse 63; Resp 17 S; Pulse Ox 96% on R/A;                                  cc3 

                                                                                             

00:15  / 81; Pulse 64; Resp 18 S; Pulse Ox 98% on R/A;                                  cc3 

01:37  / 80; Pulse 65; Resp 18 S; Pulse Ox 95% on R/A;                                  cc3 

02:15  / 69; Pulse 64; Resp 19 S; Pulse Ox 96% on R/A;                                  cc3 

03:52  / 73; Pulse 66; Resp 18 S; Pulse Ox 96% on R/A;                                  cc3 

                                                                                             

19:09 Body Mass Index 28.25 (79.38 kg, 167.64 cm)                                             hj  

                                                                                                  

MDM:                                                                                              

                                                                                             

20:50 Patient medically screened.                                                             cp  

21:00 Differential diagnosis: cholecystitis, Cholelithiasis, diverticulitis, gastritis,       cp  

      pancreatitis, Peptic Ulcer Disease, Perf. Duodenal Ulcer, Ureterolithiasis, urinary         

      tract infection.                                                                            

                                                                                             

02:06 Physician consultation: Rocky Vega MD was called at 02:06, was contacted at 02:06,       

      regarding patient's condition, requests patient to be admitted to hospitalist if            

      patient continues to have pain, otherwise patient may be discharged.                        

02:20 Data reviewed: vital signs, nurses notes, lab test result(s), radiologic studies, CT    cp  

      scan, ultrasound.                                                                           

02:20 Response to treatment: improved.                                                        cp  

02:22 Physician consultation: Lakshmi Monteiro MD was called at 02:22, was contacted at 02:22,     

      regarding admission, to the medical/surgical unit. patient's condition.                     

                                                                                                  

                                                                                             

20:53 Order name: Basic Metabolic Panel                                                       cp  

                                                                                             

20:53 Order name: CBC with Diff; Complete Time: 22:11                                         cp  

                                                                                             

22:11 Interpretation: Normal except: WBC 11.1.                                                cp  

                                                                                             

20:53 Order name: Creatinine for Radiology; Complete Time: 22:11                              cp  

                                                                                             

20:53 Order name: Hepatic Function                                                            cp  

                                                                                             

20:53 Order name: Lipase                                                                      cp  

                                                                                             

20:53 Order name: Urine Microscopic Only; Complete Time: 23:05                                cp  

                                                                                             

00:14 Interpretation: Normal except: UBACT 20-50; SQEPI 5-10.                                 cp  

                                                                                             

20:53 Order name: Basic Metabolic Panel; Complete Time: 22:11                                 EDMS

                                                                                             

20:53 Order name: Liver (Hepatic) Function; Complete Time: 22:11                              EDMS

                                                                                             

22:11 Interpretation: Normal except: AST 14; GLOB 4.1; A/G 0.9.                               cp  

                                                                                             

20:53 Order name: Lipase; Complete Time: 22:11                                                EDMS

                                                                                             

22:31 Order name: Urine Dipstick--Ancillary (enter results); Complete Time: 23:05             em1 

                                                                                             

00:14 Interpretation: Normal except: UBLD TRACE.                                              cp  

                                                                                             

22:31 Order name: Urine Pregnancy--Ancillary (enter results); Complete Time: 23:05            em1 

                                                                                             

22:45 Order name: Urine Culture                                                               EDMS

                                                                                             

03:00 Order name: Comprehensive Metabolic Panel                                               EDMS

                                                                                             

03:00 Order name: Comprehensive Metabolic Panel                                               EDMS

                                                                                             

20:57 Order name: US Abdomen Limited: RUQ/epigastric pain; Complete Time: 22:11               cp  

                                                                                             

00:15 Interpretation: Report reviewed.                                                        cp  

                                                                                             

22:13 Order name: CT Abd/Pelvis - W/Contrast: give oral contrast                              cp  

                                                                                             

03:00 Order name: Magnesium                                                                   EDMS

                                                                                             

03:00 Order name: Magnesium                                                                   EDMS

                                                                                             

03:00 Order name: Phosphorus                                                                  EDMS

                                                                                             

03:00 Order name: Phosphorus                                                                  EDMS

                                                                                             

03:01 Order name: Urinalysis                                                                  EDMS

                                                                                             

03:01 Order name: CBC with Automated Diff                                                     EDMS

                                                                                             

03:01 Order name: CBC with Automated Diff                                                     EDMS

                                                                                             

20:53 Order name: IV Saline Lock; Complete Time: 21:37                                        cp  

                                                                                             

20:53 Order name: Labs collected and sent; Complete Time: 21:37                               cp  

                                                                                             

20:53 Order name: Urine Pregnancy Test (obtain specimen); Complete Time: 22:29                cp  

                                                                                             

20:53 Order name: Urine Dipstick-Ancillary (obtain specimen); Complete Time: 22:29            cp  

                                                                                             

20:57 Order name: NPO; Complete Time: 21:37                                                   cp  

                                                                                             

03:01 Order name: CONS Physician Consult                                                      EDMS

                                                                                             

03:01 Order name: NPO                                                                         EDMS

                                                                                                  

Administered Medications:                                                                         

                                                                                             

21:15 Drug: Zofran 4 mg Route: IVP; Site: right antecubital;                                  cc3 

22:00 Follow up: Response: No adverse reaction                                                cc3 

21:19 Drug: morphine 2 mg Route: IVP; Site: right antecubital;                                cc3 

22:00 Follow up: Response: No adverse reaction                                                cc3 

21:22 Drug: Pepcid 20 mg Route: IVP; Site: right antecubital;                                 cc3 

22:00 Follow up: Response: No adverse reaction                                                cc3 

22:30 Drug: GI Cocktail without Donnatal - (Maalox Suspension 30 ml, Lidocaine Liquid 2 % 15  cc3 

      ml) Route: PO;                                                                              

23:00 Follow up: Response: No adverse reaction                                                cc3 

23:10 Drug: Bentyl 20 mg Route: PO;                                                           cc3 

                                                                                             

00:00 Follow up: Response: No adverse reaction                                                cc3 

                                                                                             

23:11 Drug: Zofran 4 mg Route: IVP; Site: right antecubital;                                  cc3 

23:30 Follow up: Response: No adverse reaction; Nausea is decreased                           cc3 

23:15 Drug: TORadol 30 mg Route: IVP; Site: right antecubital;                                cc3 

23:40 Follow up: Response: No adverse reaction; Pain is decreased                             cc3 

                                                                                             

02:40 Drug: NS 0.9% 1000 ml Route: IV; Rate: 1 bolus; Site: right antecubital;                rr5 

03:40 Follow up: Response: No adverse reaction; IV Status: Completed infusion; IV Intake:     cc3 

      1000ml                                                                                      

02:45 Drug: fentaNYL (PF) 25 mcg Route: IVP; Site: right antecubital;                         rr5 

03:00 Follow up: Response: No adverse reaction; Pain is decreased                             cc3 

03:50 Drug: NS 0.9% 1000 ml Route: IV; Rate: 125 ml/hr; Site: right antecubital;              cc3 

04:00 Follow up: Response: No adverse reaction; IV Status: Infusion continued upon admission  cc3 

                                                                                                  

                                                                                                  

Disposition:                                                                                      

04:04 Co-signature as Attending Physician, Rahul Ernandez MD.                                        [id number] 

                                                                                                  

Disposition:                                                                                      

19 02:23 Hospitalization ordered by Lakshmi Monteiro for Observation. Preliminary             

  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                         

- Bed requested for Telemetry/MedSurg (observation).                                              

- Status is Observation.                                                                      cc3 

- Condition is Stable.                                                                            

- Problem is an ongoing problem.                                                                  

- Symptoms have improved.                                                                         

UTI on Admission? No                                                                              

                                                                                                  

                                                                                                  

                                                                                                  

Signatures:                                                                                       

Dispatcher [employer]                           EDMS                                                 

Rahul Ernandez MD MD   pkl                                                  

Odell Borja, RN                      RN                                                      

Jose Rodgers PA PA cp Garcia, Cindy, RN                       RN                                                      

Maris Dang                             cc3                                                  

Dale Martins RN                      RN   rr5                                                  

                                                                                                  

Corrections: (The following items were deleted from the chart)                                    

03:05 02:23 Hospitalization Ordered by Lakshmi Monteiro MD for Observation. Preliminary          cg  

      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 Bed requested       

      for Telemetry/MedSurg (observation). Status is Observation. Condition is Stable.            

      Problem is an ongoing problem. Symptoms have improved. UTI on Admission? No. cp             

04:00 03:05 2019 02:23 Hospitalization Ordered by Lakshmi Monteiro MD for Observation.     cc3 

      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 Bed     

      requested for Telemetry/MedSurg (observation). Status is Observation. Condition is          

      Stable. Problem is an ongoing problem. Symptoms have improved. UTI on Admission? No. cg     

                                                                                                  

**************************************************************************************************

## 2019-01-06 NOTE — P.HP
Certification for Inpatient


Patient admitted to: Observation


With expected LOS: <2 Midnights


Patient will require the following post-hospital care: None


Practitioner: I am a practitioner with admitting privileges, knowledge of 

patient current condition, hospital course, and medical plan of care.


Services: Services provided to patient in accordance with Admission 

requirements found in Title 42 Section 412.3 of the Code of Federal Regulations





Patient History


Date of Service: 01/05/19


Reason for admission: Abdominal pain along with intractable nausea, vomiting


History of Present Illness: 





Patient is a 33-year-old female came to the hospital with abdominal discomfort.

  Pain was mainly in the epigastric region.  Patient had intractable nausea & 

vomiting.  She has had symptoms for the last 3 days.  She was in the ER 48 hr 

ago but her symptoms have not improve so she came back.  In the ER on this 

admission shed a CT scan which revealed malrotation of the intestines.  Surgery 

was consulted and they wanted to admit patient to the hospital for evaluation.  

She also had quite a bit of stool.


Allergies





NKDA Allergy (Uncoded 02/02/16 15:20)


 Unknown


No Known Allergies Allergy (Uncoded 03/03/17 23:55)


 Unknown





Home Medications: 








Ibuprofen [Motrin*] 800 mg PO TID PRN #30 tab 02/02/16 








- Past Medical/Surgical History


Diabetic: No


-: Anxiety, depression


-: [place] teeth extraction, Tonsillectomy, Left should laparoscopic procedure


-: wisdom teeth removed


-: L shoulder repair





- Family History


  ** Father


Medical History: Heart disease, Hypertension


Notes: afib





  ** Mother


Medical History: Hypertension





- Social History


Smoking Status: Current some day smoker


Alcohol use: Yes


CD- Drugs: No


Caffeine use: Yes


Place of Residence: Home





Review of Systems


10-point ROS is otherwise unremarkable





Physical Examination





- Vital Signs


Temperature: 97.3 F


Blood Pressure: 111/73


Pulse: 61


Respirations: 18


Pulse Ox (%): 100





- Physical Exam


General: Alert, In no apparent distress, Oriented x3


HEENT: Atraumatic, PERRLA, Mucous membr. moist/pink, EOMI, Sclerae nonicteric


Neck: Supple, 2+ carotid pulse no bruit, No LAD, Without JVD or thyroid 

abnormality


Respiratory: Clear to auscultation bilaterally, Normal air movement


Cardiovascular: Regular rate/rhythm, Normal S1 S2


Gastrointestinal: Normal bowel sounds, Soft and benign, Non-distended, No 

tenderness


Musculoskeletal: No clubbing, No swelling, No tenderness


Integumentary: No rashes


Neurological: Normal gait, Normal speech, Normal strength at 5/5 x4 extr, 

Normal tone, Sensation intact, Cranial nerves 3-12 intact, Normal affect


Lymphatics: No axilla or inguinal lymphadenopathy





Assessment & Plan





- Problems (Diagnosis)


(1) Abdominal pain


Status: Acute   





(2) Intestinal malrotation


Status: Acute   





(3) Nausea & vomiting


Status: Acute   





- Plan





1. Continue with IV hydration 


2. Continue with IV antibiotics


3. Continue with pain control


4. NPO


5. General surgery consultation; 


6. Serial H&H, and we will monitor CBC, BMP, LFTs and lipase along with 

electrolytes.


7. GI and DVT prophylaxis


Discharge Plan: Home





- Advance Directives


Does patient have a Living Will: No


Does patient have a Durable POA for Healthcare: No





- Code Status/Comfort Care


Code Status Assessed: Yes


Code Status: Full Code


Critical Care: No


Time Spent Managing PTS Care (In Minutes): 50